# Patient Record
Sex: FEMALE | URBAN - METROPOLITAN AREA
[De-identification: names, ages, dates, MRNs, and addresses within clinical notes are randomized per-mention and may not be internally consistent; named-entity substitution may affect disease eponyms.]

---

## 2017-08-16 ENCOUNTER — OFFICE VISIT (OUTPATIENT)
Dept: URGENT CARE | Facility: URGENT CARE | Age: 57
End: 2017-08-16
Payer: COMMERCIAL

## 2017-08-16 ENCOUNTER — RADIANT APPOINTMENT (OUTPATIENT)
Dept: GENERAL RADIOLOGY | Facility: CLINIC | Age: 57
End: 2017-08-16
Attending: PHYSICIAN ASSISTANT
Payer: COMMERCIAL

## 2017-08-16 VITALS
OXYGEN SATURATION: 97 % | DIASTOLIC BLOOD PRESSURE: 78 MMHG | HEART RATE: 74 BPM | WEIGHT: 142 LBS | TEMPERATURE: 97.1 F | SYSTOLIC BLOOD PRESSURE: 128 MMHG

## 2017-08-16 DIAGNOSIS — S92.415A CLOSED NONDISPLACED FRACTURE OF PROXIMAL PHALANX OF LEFT GREAT TOE, INITIAL ENCOUNTER: Primary | ICD-10-CM

## 2017-08-16 DIAGNOSIS — S99.922A INJURY OF LEFT GREAT TOE, INITIAL ENCOUNTER: ICD-10-CM

## 2017-08-16 DIAGNOSIS — R23.8 DRY SCALP: ICD-10-CM

## 2017-08-16 PROCEDURE — 73660 X-RAY EXAM OF TOE(S): CPT | Mod: LT

## 2017-08-16 PROCEDURE — 99203 OFFICE O/P NEW LOW 30 MIN: CPT | Performed by: PHYSICIAN ASSISTANT

## 2017-08-16 NOTE — MR AVS SNAPSHOT
After Visit Summary   8/16/2017    Alberto Michael    MRN: [id number]           Patient Information     Date Of Birth          1960        Visit Information        Provider Department      8/16/2017 6:50 PM Michelle Wong PA-C [facility]        Today's Diagnoses     Closed nondisplaced fracture of proximal phalanx of left great toe, initial encounter    -  1    Injury of left great toe, initial encounter          Care Instructions      Closed Toe Fracture  Your toe is broken (fractured). This causes local pain, swelling, and sometimes bruising. This injury usually takes about 4 to 6 weeks to heal, but can sometimes take longer. Toe injuries are often treated by taping the injured toe to the next one (clifford taping). This protects the injured toe and holds it in position.    If the toenail has been severely injured, it may fall off in 1 to 2 weeks. It takes up to 12 months for a new toenail to grow back.  Home care  Follow these guidelines when caring for yourself at home:    You may be given a cast shoe to wear to keep your toe from moving. If not, you can use a sandal or any shoe that doesn t put pressure on the injured toe until the swelling and pain go away. If using a sandal, be careful not to strike your foot against anything. Another injury could make the fracture worse. If you were given crutches, don t put full weight on the injured foot until you can do so without pain, or as directed by your healthcare provider.    Keep your foot elevated to reduce pain and swelling. When sleeping, put a pillow under the injured leg. When sitting, support the injured leg so it is above your waist. This is very important during the first 2 days (48 hours).    Put an ice pack on the injured area. Do this for 20 minutes every 1 to 2 hours the first day for pain relief. You can make an ice pack by wrapping a plastic bag of ice cubes in a thin towel. As the ice melts, be careful  that any cloth or paper tape doesn t get wet. Continue using the ice pack 3 to 4 times a day for the next 2 days. Then use the ice pack as needed to ease pain and swelling.    If buddy tape was used and it becomes wet or dirty, change it. You may replace it with paper, plastic, or cloth tape. Cloth tape and paper tapes must be kept dry.    You may use acetaminophen or ibuprofen to control pain, unless another pain medicine was prescribed. If you have chronic liver or kidney disease, talk with your healthcare provider before using these medicines. Also talk with your provider if you ve had a stomach ulcer or gastrointestinal bleeding.    You may return to sports or physical education activities after 4 weeks when you can run without pain, or as directed by your healthcare provider.  Follow-up care  Follow up with your healthcare provider in 1 week, or as advised. This is to make sure the bone is healing the way it should.  X-rays may be taken. You will be told of any new findings that may affect your care.  When to seek medical advice  Call your healthcare provider right away if any of these occur:    Pain or swelling gets worse    The cast/splint cracks    The cast and padding get wet and stays wet more than 24 hours    Bad odor from the cast/splint or wound fluid stains the cast    Tightness or pressure under the cast/splint gets worse    Toe becomes cold, blue, numb, or tingly    You can t move the toe    Signs of infection: fever, redness, warmth, swelling, or drainage from the wound or cast    Fever of 100.4 F (38 C) or higher, or as directed by your healthcare provider  Date Last Reviewed: 2/1/2017 2000-2017 [employer]. [address] [place] [address], [place] [address]. All rights reserved. This information is not intended as a substitute for professional medical care. Always follow your healthcare professional's instructions.                Follow-ups after your visit        Who to contact     If you  "have questions or need follow up information about today's clinic visit or your schedule please contact [facility] directly at [phone].  Normal or non-critical lab and imaging results will be communicated to you by MyChart, letter or phone within 4 business days after the clinic has received the results. If you do not hear from us within 7 days, please contact the clinic through [employer] or phone. If you have a critical or abnormal lab result, we will notify you by phone as soon as possible.  Submit refill requests through [employer] or call your pharmacy and they will forward the refill request to us. Please allow 3 business days for your refill to be completed.          Additional Information About Your Visit        [employer] Information     [employer] lets you send messages to your doctor, view your test results, renew your prescriptions, schedule appointments and more. To sign up, go to [email or web address] . Click on \"Log in\" on the left side of the screen, which will take you to the Welcome page. Then click on \"Sign up Now\" on the right side of the page.     You will be asked to enter the access code listed below, as well as some personal information. Please follow the directions to create your username and password.     Your access code is: [email or web address]  Expires: 2017  7:59 PM     Your access code will  in 90 days. If you need help or a new code, please call your [facility] clinic or [phone].        Care EveryWhere ID     This is your Care EveryWhere ID. This could be used by other organizations to access your [facility] medical records  [id number]        Your Vitals Were     Pulse Temperature Pulse Oximetry             74 97.1  F (36.2  C) (Oral) 97%          Blood Pressure from Last 3 Encounters:   17 128/78    Weight from Last 3 Encounters:   17 142 lb (64.4 kg)               Primary Care Provider Office Phone # Fax #    Scott Traylor [phone] " [phone]       [facility] [place] [address]  [place] [address]        Equal Access to Services     ROWENA RENDON : Hadii aad ku hadkameron Russ, wabenjida luqlexi, qaybta kaalmada denise, jim ramos lacarlosfei mari. So [facility] [phone].    ATENCIÓN: Si habla español, tiene a nathan disposición servicios gratuitos de asistencia lingüística. Llame al [phone].    We comply with applicable federal civil rights laws and [place] laws. We do not discriminate on the basis of race, color, national origin, age, disability sex, sexual orientation or gender identity.            Thank you!     Thank you for choosing [facility]  for your care. Our goal is always to provide you with excellent care. Hearing back from our patients is one way we can continue to improve our services. Please take a few minutes to complete the written survey that you may receive in the mail after your visit with us. Thank you!             Your Updated Medication List - Protect others around you: Learn how to safely use, store and throw away your medicines at www.disposemymeds.org.          This list is accurate as of: 8/16/17  7:59 PM.  Always use your most recent med list.                   Brand Name Dispense Instructions for use Diagnosis    levothyroxine 25 mcg/mL Susp    SYNTHROID     Take by mouth every morning (before breakfast)

## 2017-08-17 NOTE — PROGRESS NOTES
SUBJECTIVE:  Chief Complaint   Patient presents with     Urgent Care     Toe Injury     tripped over l toe on sunday,bruised and swollen      Alberto Michael is a 57 year old female presents with a chief complaint of left toe(s) great pain, swelling and tenderness.  The injury occurred 3 day(s) ago.   The injury happened while at a park. How: she tripped on her toe. She felt immediate pain and swelling.  The patient complained of moderate pain  and has had decreased ROM.  Pain exacerbated by weight-bearing.  Relieved by nothing.  She treated it initially with Ibuprofen. This is the first time this type of injury has occurred to this patient.     ## Patient complains of a dry scalp. She has been using triamcinolone cream, which has helped.     No past medical history on file.  Current Outpatient Prescriptions   Medication Sig Dispense Refill     levothyroxine (SYNTHROID) 25 mcg/mL SUSP Take by mouth every morning (before breakfast)       Social History   Substance Use Topics     Smoking status: Never Smoker     Smokeless tobacco: Not on file     Alcohol use Not on file       ROS:  Review of systems negative except as stated above.    EXAM:   /78  Pulse 74  Temp 97.1  F (36.2  C) (Oral)  Wt 142 lb (64.4 kg)  SpO2 97%  Gen: healthy,alert,no distress  Extremity: Left great toe has erythema, swelling and decreased ROM.   There is not compromise to the distal circulation.  Pulses are +2 and CRT is brisk  GENERAL APPEARANCE: healthy, alert and no distress  EXTREMITIES: peripheral pulses normal  SKIN: no suspicious lesions or rashes  NEURO: Normal strength and tone, sensory exam grossly normal, mentation intact and speech normal  SCALP: Multiple dry patches throughout scalp.     X-RAY was done.    ASSESSMENT/PLAN:  1. Closed nondisplaced fracture of proximal phalanx of left great toe, initial encounter  Post op shoe  600mg IBU THREE TIMES PER DAY  Repeat Xrays in 1-2 weeks to ensure healing  - order for DME; Post OP  shoe  Dispense: 1 Device; Refill: 0    2. Injury of left great toe, initial encounter  - XR Toe Left G/E 2 Views; Future    3. Dry scalp  Selsun blue  Zyrtec daily.     Michelle Wong PA-C

## 2017-08-17 NOTE — PATIENT INSTRUCTIONS
Closed Toe Fracture  Your toe is broken (fractured). This causes local pain, swelling, and sometimes bruising. This injury usually takes about 4 to 6 weeks to heal, but can sometimes take longer. Toe injuries are often treated by taping the injured toe to the next one (buddy taping). This protects the injured toe and holds it in position.    If the toenail has been severely injured, it may fall off in 1 to 2 weeks. It takes up to 12 months for a new toenail to grow back.  Home care  Follow these guidelines when caring for yourself at home:    You may be given a cast shoe to wear to keep your toe from moving. If not, you can use a sandal or any shoe that doesn t put pressure on the injured toe until the swelling and pain go away. If using a sandal, be careful not to strike your foot against anything. Another injury could make the fracture worse. If you were given crutches, don t put full weight on the injured foot until you can do so without pain, or as directed by your healthcare provider.    Keep your foot elevated to reduce pain and swelling. When sleeping, put a pillow under the injured leg. When sitting, support the injured leg so it is above your waist. This is very important during the first 2 days (48 hours).    Put an ice pack on the injured area. Do this for 20 minutes every 1 to 2 hours the first day for pain relief. You can make an ice pack by wrapping a plastic bag of ice cubes in a thin towel. As the ice melts, be careful that any cloth or paper tape doesn t get wet. Continue using the ice pack 3 to 4 times a day for the next 2 days. Then use the ice pack as needed to ease pain and swelling.    If buddy tape was used and it becomes wet or dirty, change it. You may replace it with paper, plastic, or cloth tape. Cloth tape and paper tapes must be kept dry.    You may use acetaminophen or ibuprofen to control pain, unless another pain medicine was prescribed. If you have chronic liver or kidney disease,  talk with your healthcare provider before using these medicines. Also talk with your provider if you ve had a stomach ulcer or gastrointestinal bleeding.    You may return to sports or physical education activities after 4 weeks when you can run without pain, or as directed by your healthcare provider.  Follow-up care  Follow up with your healthcare provider in 1 week, or as advised. This is to make sure the bone is healing the way it should.  X-rays may be taken. You will be told of any new findings that may affect your care.  When to seek medical advice  Call your healthcare provider right away if any of these occur:    Pain or swelling gets worse    The cast/splint cracks    The cast and padding get wet and stays wet more than 24 hours    Bad odor from the cast/splint or wound fluid stains the cast    Tightness or pressure under the cast/splint gets worse    Toe becomes cold, blue, numb, or tingly    You can t move the toe    Signs of infection: fever, redness, warmth, swelling, or drainage from the wound or cast    Fever of 100.4 F (38 C) or higher, or as directed by your healthcare provider  Date Last Reviewed: 2/1/2017 2000-2017 [employer]. [address] [place] [id number]. All rights reserved. This information is not intended as a substitute for professional medical care. Always follow your healthcare professional's instructions.

## 2017-08-17 NOTE — NURSING NOTE
Chief Complaint   Patient presents with     Urgent Care     Toe Injury     tripped over l toe on sunday,bruised and swollen        Initial /78  Pulse 74  Temp 97.1  F (36.2  C) (Oral)  Wt 142 lb (64.4 kg)  SpO2 97% There is no height or weight on file to calculate BMI.  Medication Reconciliation: complete   Nakia COLBERT MA

## 2017-10-15 ENCOUNTER — HEALTH MAINTENANCE LETTER (OUTPATIENT)
Age: 57
End: 2017-10-15